# Patient Record
Sex: MALE | Race: OTHER | NOT HISPANIC OR LATINO | URBAN - METROPOLITAN AREA
[De-identification: names, ages, dates, MRNs, and addresses within clinical notes are randomized per-mention and may not be internally consistent; named-entity substitution may affect disease eponyms.]

---

## 2024-01-31 ENCOUNTER — EMERGENCY (EMERGENCY)
Facility: HOSPITAL | Age: 59
LOS: 1 days | Discharge: HOME | End: 2024-01-31
Attending: EMERGENCY MEDICINE | Admitting: EMERGENCY MEDICINE
Payer: COMMERCIAL

## 2024-01-31 VITALS
HEIGHT: 73 IN | SYSTOLIC BLOOD PRESSURE: 130 MMHG | WEIGHT: 274.92 LBS | OXYGEN SATURATION: 98 % | HEART RATE: 70 BPM | RESPIRATION RATE: 18 BRPM | TEMPERATURE: 98 F | DIASTOLIC BLOOD PRESSURE: 70 MMHG

## 2024-01-31 VITALS
RESPIRATION RATE: 18 BRPM | DIASTOLIC BLOOD PRESSURE: 77 MMHG | TEMPERATURE: 98 F | OXYGEN SATURATION: 98 % | SYSTOLIC BLOOD PRESSURE: 122 MMHG | HEART RATE: 77 BPM

## 2024-01-31 PROCEDURE — 12013 RPR F/E/E/N/L/M 2.6-5.0 CM: CPT

## 2024-01-31 PROCEDURE — 12002 RPR S/N/AX/GEN/TRNK2.6-7.5CM: CPT

## 2024-01-31 PROCEDURE — 99282 EMERGENCY DEPT VISIT SF MDM: CPT

## 2024-01-31 PROCEDURE — 99284 EMERGENCY DEPT VISIT MOD MDM: CPT | Mod: 25

## 2024-01-31 NOTE — ED PROVIDER NOTE - PROVIDER TOKENS
FREE:[LAST:[Removal of staples in 10-14 days - can return to the ED or go to your primary care provider],PHONE:[(   )    -],FAX:[(   )    -]]

## 2024-01-31 NOTE — ED ADULT NURSE NOTE - OBJECTIVE STATEMENT
22:20-Pt presented ot ER with c/o acute onset of lumbar pain post fall down 7 stairs, denies head trauma, loc, reported pmh of lumbar disc pain, reports period of lightheadedness after. In ER eval by Provider, will treat per plan of care. MALCOLM Young

## 2024-01-31 NOTE — ED PROVIDER NOTE - NSFOLLOWUPINSTRUCTIONS_ED_ALL_ED_FT
Laceration Care, Adult  A laceration is a cut that may go through all layers of the skin and into the tissue that is right under the skin. Some lacerations heal on their own. Others need to be closed with stitches (sutures), staples, skin adhesive strips, or skin glue.    Proper care of a laceration reduces the risk for infection, helps the laceration heal better, and may prevent scarring.    General tips  Keep the wound clean and dry.  Do not scratch or pick at the wound.  Wash your hands with soap and water for at least 20 seconds before and after touching your wound or changing your bandage (dressing). If soap and water are not available, use hand .  Do not usedisinfectants or antiseptics, such as rubbing alcohol, to clean your wound unless told by your health care provider.  If you were given a dressing, you should change it at least once a day, or as told by your health care provider. You should also change it if it becomes wet or dirty.  How to care for your laceration  If sutures or staples were used:    Keep the wound completely dry for the first 24 hours, or as told by your health care provider. After that time, you may shower or bathe. Do not soak your wound in water until after the sutures or staples have been removed.  Clean the wound once each day, or as told by your health care provider. To do this:  Wash the wound with soap and water.  Rinse the wound with water to remove all soap.  Pat the wound dry with a clean towel. Do not rub the wound.  After cleaning the wound, apply a thin layer of antibiotic ointment, other topical ointments, or a non-adherent dressing as told by your health care provider. This will help prevent infection and keep the dressing from sticking to the wound.  Have the sutures or staples removed as told by your health care provider. Do not remove sutures or staples yourself.  If skin adhesive strips were used:    Do not get the skin adhesive strips wet. You may shower or bathe, but keep the wound dry.  If the wound gets wet, pat it dry with a clean towel. Do not rub the wound.  Skin adhesive strips fall off on their own. If adhesive strip edges start to loosen and curl up, you may trim the loose edges. Do not remove adhesive strips completely unless your health care provider tells you to do that.  If skin glue was used:    You may shower or bathe, but try to keep the wound dry. Do not soak the wound in water.  After showering or bathing, pat the wound dry with a clean towel. Do not rub the wound.  Do not do any activities that will make you sweat a lot until the skin glue has fallen off.  Do not apply liquid, cream, or ointment medicine to the wound while the skin glue is in place. Doing this may loosen the film before the wound has healed.  If a dressing is placed over the wound, do not apply tape directly over the skin glue. Doing this may cause the glue to be pulled off before the wound has healed.  Do not pick at the glue. Skin glue usually remains in place for 5–10 days and then falls off the skin.  Follow these instructions at home:  Medicines    Take over-the-counter and prescription medicines only as told by your health care provider.  If you were prescribed an antibiotic medicine or ointment, take or apply it as told by your health care provider. Do not stop using it even if your condition improves.  Managing pain and swelling    If directed, put ice on the injured area. To do this:  Put ice in a plastic bag.  Place a towel between your skin and the bag.  Leave the ice on for 20 minutes, 2–3 times a day.  Remove the ice if your skin turns bright red. This is very important. If you cannot feel pain, heat, or cold, you have a greater risk of damage to the area.  Raise (elevate) the injured area above the level of your heart while you are sitting or lying down for the first 24–48 hours after the laceration is repaired.  General instructions    Two wounds closed with skin glue. One is normal. The other is red with pus and infected.  Avoid any activity that could cause your wound to reopen.  Check your wound every day for signs of infection. Watch for:  More redness, swelling, or pain.  Fluid or blood.  Warmth.  Pus or a bad smell.  Keep all follow-up visits. This is important.  Contact a health care provider if:  You received a tetanus shot and you have swelling, severe pain, redness, or bleeding at the injection site.  Your closed wound breaks open.  You have any of these signs of infection:  More redness, swelling, or pain around your wound.  Fluid or blood coming from your wound.  Warmth coming from your wound.  Pus or a bad smell coming from your wound.  A fever.  You notice something coming out of the wound, such as wood or glass.  Your pain is not controlled with medicine.  You notice a change in the color of your skin near your wound.  You need to change the dressing often.  You develop a new rash.  You have numbness around the wound.  Get help right away if:  You develop severe swelling around the wound.  Your pain suddenly increases and is severe.  You develop painful lumps near the wound or on skin anywhere else on your body.  You have a red streak going away from your wound.  The wound is on your hand or foot, and you cannot properly move a finger or toe.  The wound is on your hand or foot, and you notice that your fingers or toes look pale or bluish.  Summary  A laceration is a cut that may go through all layers of the skin and into the tissue that is right under the skin.  Some lacerations heal on their own. Others need to be closed with stitches (sutures), staples, skin adhesive strips, or skin glue.  Proper care of a laceration reduces the risk of infection, helps the laceration heal better, and may prevent scarring.  This information is not intended to replace advice given to you by your health care provider. Make sure you discuss any questions you have with your health care provider.      Lumbar Strain  A lumbar strain, which is sometimes called a low-back strain, is a stretch or tear in a muscle or tendons in the lower back (lumbar spine). Tendons are the strong cords of tissue that attach muscle to bone. This type of injury occurs when muscles or tendons are torn or are stretched beyond their limits.    Lumbar strains can range from mild to severe. Mild strains may involve stretching a muscle or tendon without tearing it. These may heal in 1–2 weeks. More severe strains involve tearing of muscle fibers or tendons. These will cause more pain and may take 6–8 weeks to heal.    What are the causes?  This condition may be caused by:  Trauma, such as a fall or a hit to the body.  Twisting or overstretching the back. This may result from doing activities that take a lot of energy, such as lifting heavy objects.  What increases the risk?  A lumbar strain is more common in:  Athletes.  People with obesity.  People who do repeated lifting, bending, or other movements that involve their back.  What are the signs or symptoms?  Symptoms of this condition may include:  Sharp or dull pain in the lower back that does not go away. The pain may spread to the buttocks.  Stiffness or limited range of motion.  Sudden muscle tightening (spasms).  How is this diagnosed?  This condition may be diagnosed based on:  Your symptoms.  Your medical history.  A physical exam.  Imaging tests, such as:  X-rays.  MRI.  How is this treated?  Treatment for this condition may include:  Rest.  Applying heat and cold to the affected area.  Over-the-counter medicines to help with pain and inflammation, such as NSAIDs.  Prescription medicine for pain or to relax the muscles. These may be needed for a short time.  Physical therapy exercises to improve movement and strength.  Follow these instructions at home:  Managing pain, stiffness, and swelling    Bag of ice on a towel on the skin.  A heating pad for use on the affected area.  If told, put ice on the injured area during the first 24 hours after your injury.  Put ice in a plastic bag.  Place a towel between your skin and the bag.  Leave the ice on for 20 minutes, 2–3 times a day.  If told, apply heat to the affected area as often as told by your health care provider. Use the heat source that your health care provider recommends, such as a moist heat pack or a heating pad.  Place a towel between your skin and the heat source.  Leave the heat on for 20–30 minutes.  If your skin turns bright red, remove the ice or heat right away to prevent skin damage. The risk of damage is higher if you cannot feel pain, heat, or cold.  Activity    Rest and return to your normal activities as told by your health care provider. Ask your health care provider what activities are safe for you.  Do exercises as told by your health care provider.  General instructions    Take over-the-counter and prescription medicines only as told by your health care provider.  Ask your health care provider if the medicine prescribed to you:  Requires you to avoid driving or using machinery.  Can cause constipation. You may need to take these actions to prevent or treat constipation:  Drink enough fluid to keep your urine pale yellow.  Take over-the-counter or prescription medicines.  Eat foods that are high in fiber, such as beans, whole grains, and fresh fruits and vegetables.  Limit foods that are high in fat and processed sugars, such as fried or sweet foods.  Do not use any products that contain nicotine or tobacco. These products include cigarettes, chewing tobacco, and vaping devices, such as e-cigarettes. If you need help quitting, ask your health care provider.  How is this prevented?  A person showing how to lift a heavy object. The correct way shows the person's knees bent.  To prevent a future low-back injury:  Always warm up properly before physical activity or sports.  Cool down and stretch after being active.  Use correct form when playing sports and lifting heavy objects. Bend your knees before you lift heavy objects.  Use good posture when sitting and standing.  Stay physically fit and keep a healthy weight.  Do at least 150 minutes of moderate intensity exercise each week, such as brisk walking or water aerobics.  Do strength exercises at least 2 times each week.  Contact a health care provider if:  Your back pain does not improve after several weeks of treatment.  Your symptoms get worse.  You have a fever.  Get help right away if:  Your back pain is severe.  You are unable to stand or walk.  You develop pain in your legs.  You have weakness in your buttocks or legs.  You have trouble controlling when you urinate or when you have a bowel movement.  You have frequent, painful, or bloody urination.  This information is not intended to replace advice given to you by your health care provider. Make sure you discuss any questions you have with your health care provider.

## 2024-01-31 NOTE — ED PROVIDER NOTE - SKIN, MLM
right lateral buttock/thigh area 5cm laceration, 1small piece glass removed from skin surface, minimal active bleeding

## 2024-01-31 NOTE — ED PROVIDER NOTE - CARE PROVIDER_API CALL
Removal of staples in 10-14 days - can return to the ED or go to your primary care provider,   Phone: (   )    -  Fax: (   )    -  Follow Up Time:

## 2024-01-31 NOTE — ED PROVIDER NOTE - PATIENT PORTAL LINK FT
You can access the FollowMyHealth Patient Portal offered by [facility] by registering at the following website: [email or web address]. By joining [facility] [employer]’s FollowMyHealth portal, you will also be able to view your health information using other applications (apps) compatible with our system.

## 2024-01-31 NOTE — ED PROVIDER NOTE - CLINICAL SUMMARY MEDICAL DECISION MAKING FREE TEXT BOX
s/p fall, h/o low back pain, exacerbated pain, c/o lac to right lateral thigh, pt declines imaging and medication, has meds at home, but states they mostly don't help, prefers discharge after lac repair, will f/u with his doctors as needed

## 2024-01-31 NOTE — ED PROVIDER NOTE - OBJECTIVE STATEMENT
58y M was carrying glasses down stairs, tripped on cat, fell down a few stairs, hit back, has h/o lumbar issues, c/o feeling muscle spasm across mid lumbar region, no pain/paresthesias into LEs, was ambulatory, but with effort, also laceration right lateral thigh from glass, states wife pulled some glass out of lac at home 58y M was carrying glasses down stairs, tripped on cat, fell down a few stairs, hit back, has h/o lumbar issues, c/o feeling muscle spasm across mid lumbar region, no pain/paresthesias into LEs, no bowel/bladder incontinence was ambulatory, but with effort, also laceration right lateral thigh from glass, states wife pulled some glass out of lac at home, denies other injury

## 2024-01-31 NOTE — ED ADULT NURSE NOTE - NSFALLUNIVINTERV_ED_ALL_ED
Bed/Stretcher in lowest position, wheels locked, appropriate side rails in place/Call bell, personal items and telephone in reach/Instruct patient to call for assistance before getting out of bed/chair/stretcher/Non-slip footwear applied when patient is off stretcher/[place] to call system/Physically safe environment - no spills, clutter or unnecessary equipment/Purposeful proactive rounding/Room/bathroom lighting operational, light cord in reach

## 2025-06-09 ENCOUNTER — RX ONLY (RX ONLY)
Age: 60
End: 2025-06-09

## 2025-06-09 ENCOUNTER — OFFICE (OUTPATIENT)
Dept: URBAN - METROPOLITAN AREA CLINIC 70 | Facility: CLINIC | Age: 60
Setting detail: OPHTHALMOLOGY
End: 2025-06-09
Payer: COMMERCIAL

## 2025-06-09 DIAGNOSIS — H10.413: ICD-10-CM

## 2025-06-09 DIAGNOSIS — Z94.7: ICD-10-CM

## 2025-06-09 PROCEDURE — 99213 OFFICE O/P EST LOW 20 MIN: CPT | Performed by: OPTOMETRIST

## 2025-06-09 ASSESSMENT — CONFRONTATIONAL VISUAL FIELD TEST (CVF)
OS_FINDINGS: FULL
OD_FINDINGS: FULL

## 2025-06-09 ASSESSMENT — LID EXAM ASSESSMENTS
OD_COMMENTS: TELANGIECTASIA
OS_COMMENTS: TELANGIECTASIA

## 2025-06-09 ASSESSMENT — REFRACTION_MANIFEST
OD_CYLINDER: +12.50
OD_AXIS: 085
OS_AXIS: 020
OD_SPHERE: -25.25
OS_SPHERE: -24.00
OS_CYLINDER: +2.25

## 2025-06-09 ASSESSMENT — VISUAL ACUITY
OS_BCVA: 20/400
OD_BCVA: 20/50

## 2025-06-09 ASSESSMENT — REFRACTION_AUTOREFRACTION
OD_AXIS: 083
OD_SPHERE: -25.25
OS_AXIS: 021
OS_SPHERE: -24.00
OD_CYLINDER: +12.50
OS_CYLINDER: +2.25

## 2025-06-09 ASSESSMENT — KERATOMETRY
OD_AXISANGLE_DEGREES: 087
OD_K1POWER_DIOPTERS: 44.25
OS_K1POWER_DIOPTERS: 48.00
OS_AXISANGLE_DEGREES: 076
OD_K2POWER_DIOPTERS: 52.00
OS_K2POWER_DIOPTERS: 49.25

## 2025-06-09 ASSESSMENT — CORNEAL EDEMA - FOLDS/STRIAE
OD_FOLDSSTRIAE: 1+
OS_FOLDSSTRIAE: 1+

## 2025-06-16 ENCOUNTER — OFFICE (OUTPATIENT)
Dept: URBAN - METROPOLITAN AREA CLINIC 70 | Facility: CLINIC | Age: 60
Setting detail: OPHTHALMOLOGY
End: 2025-06-16
Payer: COMMERCIAL

## 2025-06-16 DIAGNOSIS — H10.413: ICD-10-CM

## 2025-06-16 PROCEDURE — 99213 OFFICE O/P EST LOW 20 MIN: CPT | Performed by: OPTOMETRIST

## 2025-06-16 ASSESSMENT — LID EXAM ASSESSMENTS
OD_COMMENTS: TELANGIECTASIA
OS_COMMENTS: TELANGIECTASIA

## 2025-06-16 ASSESSMENT — REFRACTION_AUTOREFRACTION
OD_CYLINDER: +12.50
OS_CYLINDER: +2.25
OS_SPHERE: -24.00
OS_AXIS: 021
OD_AXIS: 083
OD_SPHERE: -25.25

## 2025-06-16 ASSESSMENT — VISUAL ACUITY
OD_BCVA: 20/50
OS_BCVA: 20/HM

## 2025-06-16 ASSESSMENT — KERATOMETRY
OS_K1POWER_DIOPTERS: 48.00
OD_K1POWER_DIOPTERS: 44.25
OS_K2POWER_DIOPTERS: 49.25
OD_K2POWER_DIOPTERS: 52.00
OS_AXISANGLE_DEGREES: 076
OD_AXISANGLE_DEGREES: 087

## 2025-06-16 ASSESSMENT — REFRACTION_MANIFEST
OS_SPHERE: -24.00
OD_AXIS: 085
OS_CYLINDER: +2.25
OD_SPHERE: -25.25
OD_CYLINDER: +12.50
OS_AXIS: 020

## 2025-06-16 ASSESSMENT — CORNEAL EDEMA - FOLDS/STRIAE
OS_FOLDSSTRIAE: 1+
OD_FOLDSSTRIAE: 1+

## 2025-06-16 ASSESSMENT — CONFRONTATIONAL VISUAL FIELD TEST (CVF)
OD_FINDINGS: FULL
OS_FINDINGS: FULL